# Patient Record
Sex: FEMALE
[De-identification: names, ages, dates, MRNs, and addresses within clinical notes are randomized per-mention and may not be internally consistent; named-entity substitution may affect disease eponyms.]

---

## 2017-10-23 PROBLEM — Z00.00 ENCOUNTER FOR PREVENTIVE HEALTH EXAMINATION: Status: ACTIVE | Noted: 2017-10-23

## 2018-03-01 ENCOUNTER — APPOINTMENT (OUTPATIENT)
Dept: ENDOCRINOLOGY | Facility: CLINIC | Age: 53
End: 2018-03-01

## 2025-06-09 ENCOUNTER — EMERGENCY (EMERGENCY)
Facility: HOSPITAL | Age: 60
LOS: 1 days | End: 2025-06-09
Attending: STUDENT IN AN ORGANIZED HEALTH CARE EDUCATION/TRAINING PROGRAM
Payer: COMMERCIAL

## 2025-06-09 ENCOUNTER — NON-APPOINTMENT (OUTPATIENT)
Age: 60
End: 2025-06-09

## 2025-06-09 VITALS
OXYGEN SATURATION: 99 % | WEIGHT: 154.98 LBS | HEART RATE: 71 BPM | RESPIRATION RATE: 18 BRPM | TEMPERATURE: 98 F | DIASTOLIC BLOOD PRESSURE: 86 MMHG | HEIGHT: 64 IN | SYSTOLIC BLOOD PRESSURE: 161 MMHG

## 2025-06-09 NOTE — ED ADULT TRIAGE NOTE - MEANS OF ARRIVAL
[FreeTextEntry1] : Spirometry was performed.\par FVC 2.94 L which is 67% predicted.\par FEV1 1.13 L which is 35% predicted.\par FEV1/FVC ratio 39%.\par FEF 25/75% 0.40 L/s which is 17% predicted.\par PEF is 2.73 L/s which is 33% predicted.\par There is significant bronchodilator response. ambulatory

## 2025-06-09 NOTE — ED ADULT TRIAGE NOTE - CHIEF COMPLAINT QUOTE
woke up at 0400 with HA and Dizziness. states she went to bed at 1900 yesterday and felt normal. states her legs feel "wobblie". states she went to urgent care and had an EKG that was abnormal. pt states there were abnormalities with her T waves and her HR was slow.

## 2025-06-10 VITALS
RESPIRATION RATE: 18 BRPM | TEMPERATURE: 98 F | SYSTOLIC BLOOD PRESSURE: 137 MMHG | DIASTOLIC BLOOD PRESSURE: 75 MMHG | HEART RATE: 61 BPM | OXYGEN SATURATION: 98 %

## 2025-06-10 LAB
ALBUMIN SERPL ELPH-MCNC: 4 G/DL — SIGNIFICANT CHANGE UP (ref 3.3–5)
ALP SERPL-CCNC: 74 U/L — SIGNIFICANT CHANGE UP (ref 40–120)
ALT FLD-CCNC: 14 U/L — SIGNIFICANT CHANGE UP (ref 10–45)
ANION GAP SERPL CALC-SCNC: 13 MMOL/L — SIGNIFICANT CHANGE UP (ref 5–17)
APPEARANCE UR: CLEAR — SIGNIFICANT CHANGE UP
AST SERPL-CCNC: 15 U/L — SIGNIFICANT CHANGE UP (ref 10–40)
BASOPHILS # BLD AUTO: 0.03 K/UL — SIGNIFICANT CHANGE UP (ref 0–0.2)
BASOPHILS NFR BLD AUTO: 0.6 % — SIGNIFICANT CHANGE UP (ref 0–2)
BILIRUB SERPL-MCNC: 0.4 MG/DL — SIGNIFICANT CHANGE UP (ref 0.2–1.2)
BILIRUB UR-MCNC: NEGATIVE — SIGNIFICANT CHANGE UP
BUN SERPL-MCNC: 13 MG/DL — SIGNIFICANT CHANGE UP (ref 7–23)
CALCIUM SERPL-MCNC: 9.8 MG/DL — SIGNIFICANT CHANGE UP (ref 8.4–10.5)
CHLORIDE SERPL-SCNC: 100 MMOL/L — SIGNIFICANT CHANGE UP (ref 96–108)
CO2 SERPL-SCNC: 22 MMOL/L — SIGNIFICANT CHANGE UP (ref 22–31)
COLOR SPEC: YELLOW — SIGNIFICANT CHANGE UP
CREAT SERPL-MCNC: 0.7 MG/DL — SIGNIFICANT CHANGE UP (ref 0.5–1.3)
DIFF PNL FLD: NEGATIVE — SIGNIFICANT CHANGE UP
EGFR: 101 ML/MIN/1.73M2 — SIGNIFICANT CHANGE UP
EGFR: 101 ML/MIN/1.73M2 — SIGNIFICANT CHANGE UP
EOSINOPHIL # BLD AUTO: 0.05 K/UL — SIGNIFICANT CHANGE UP (ref 0–0.5)
EOSINOPHIL NFR BLD AUTO: 1 % — SIGNIFICANT CHANGE UP (ref 0–6)
GLUCOSE SERPL-MCNC: 244 MG/DL — HIGH (ref 70–99)
GLUCOSE UR QL: >=1000 MG/DL
HCT VFR BLD CALC: 42.6 % — SIGNIFICANT CHANGE UP (ref 34.5–45)
HGB BLD-MCNC: 13.8 G/DL — SIGNIFICANT CHANGE UP (ref 11.5–15.5)
IMM GRANULOCYTES NFR BLD AUTO: 0.2 % — SIGNIFICANT CHANGE UP (ref 0–0.9)
KETONES UR QL: NEGATIVE MG/DL — SIGNIFICANT CHANGE UP
LEUKOCYTE ESTERASE UR-ACNC: NEGATIVE — SIGNIFICANT CHANGE UP
LYMPHOCYTES # BLD AUTO: 2.31 K/UL — SIGNIFICANT CHANGE UP (ref 1–3.3)
LYMPHOCYTES # BLD AUTO: 44.6 % — HIGH (ref 13–44)
MCHC RBC-ENTMCNC: 30.4 PG — SIGNIFICANT CHANGE UP (ref 27–34)
MCHC RBC-ENTMCNC: 32.4 G/DL — SIGNIFICANT CHANGE UP (ref 32–36)
MCV RBC AUTO: 93.8 FL — SIGNIFICANT CHANGE UP (ref 80–100)
MONOCYTES # BLD AUTO: 0.49 K/UL — SIGNIFICANT CHANGE UP (ref 0–0.9)
MONOCYTES NFR BLD AUTO: 9.5 % — SIGNIFICANT CHANGE UP (ref 2–14)
NEUTROPHILS # BLD AUTO: 2.29 K/UL — SIGNIFICANT CHANGE UP (ref 1.8–7.4)
NEUTROPHILS NFR BLD AUTO: 44.1 % — SIGNIFICANT CHANGE UP (ref 43–77)
NITRITE UR-MCNC: NEGATIVE — SIGNIFICANT CHANGE UP
NRBC BLD AUTO-RTO: 0 /100 WBCS — SIGNIFICANT CHANGE UP (ref 0–0)
PH UR: 8 — SIGNIFICANT CHANGE UP (ref 5–8)
PLATELET # BLD AUTO: 258 K/UL — SIGNIFICANT CHANGE UP (ref 150–400)
POTASSIUM SERPL-MCNC: 4.2 MMOL/L — SIGNIFICANT CHANGE UP (ref 3.5–5.3)
POTASSIUM SERPL-SCNC: 4.2 MMOL/L — SIGNIFICANT CHANGE UP (ref 3.5–5.3)
PROT SERPL-MCNC: 6.9 G/DL — SIGNIFICANT CHANGE UP (ref 6–8.3)
PROT UR-MCNC: NEGATIVE MG/DL — SIGNIFICANT CHANGE UP
RBC # BLD: 4.54 M/UL — SIGNIFICANT CHANGE UP (ref 3.8–5.2)
RBC # FLD: 12.4 % — SIGNIFICANT CHANGE UP (ref 10.3–14.5)
SODIUM SERPL-SCNC: 135 MMOL/L — SIGNIFICANT CHANGE UP (ref 135–145)
SP GR SPEC: >1.03 — HIGH (ref 1–1.03)
TROPONIN T, HIGH SENSITIVITY RESULT: <6 NG/L — SIGNIFICANT CHANGE UP (ref 0–51)
UROBILINOGEN FLD QL: 0.2 MG/DL — SIGNIFICANT CHANGE UP (ref 0.2–1)
WBC # BLD: 5.18 K/UL — SIGNIFICANT CHANGE UP (ref 3.8–10.5)
WBC # FLD AUTO: 5.18 K/UL — SIGNIFICANT CHANGE UP (ref 3.8–10.5)

## 2025-06-10 RX ORDER — METOCLOPRAMIDE HCL 10 MG
10 TABLET ORAL ONCE
Refills: 0 | Status: COMPLETED | OUTPATIENT
Start: 2025-06-10 | End: 2025-06-10

## 2025-06-10 RX ORDER — MECLIZINE HCL 12.5 MG
25 TABLET ORAL ONCE
Refills: 0 | Status: COMPLETED | OUTPATIENT
Start: 2025-06-10 | End: 2025-06-10

## 2025-06-10 RX ORDER — ACETAMINOPHEN 500 MG/5ML
975 LIQUID (ML) ORAL ONCE
Refills: 0 | Status: COMPLETED | OUTPATIENT
Start: 2025-06-10 | End: 2025-06-10

## 2025-06-10 RX ORDER — MECLIZINE HCL 12.5 MG
1 TABLET ORAL
Qty: 15 | Refills: 0
Start: 2025-06-10 | End: 2025-06-14

## 2025-06-10 RX ADMIN — Medication 10 MILLIGRAM(S): at 03:10

## 2025-06-10 RX ADMIN — Medication 25 MILLIGRAM(S): at 10:08

## 2025-06-10 RX ADMIN — Medication 975 MILLIGRAM(S): at 04:00

## 2025-06-10 RX ADMIN — Medication 975 MILLIGRAM(S): at 03:10

## 2025-06-10 NOTE — ED PROVIDER NOTE - DATE/TIME 1
10-Lanre-2025 09:56

## 2025-06-10 NOTE — ED PROVIDER NOTE - ATTENDING CONTRIBUTION TO CARE
Chris Guadalupe DO: I have personally performed a face to face medical and diagnostic evaluation of the patient. I have discussed with and reviewed the Resident's and/or ACP's and/or Medical/PA/NP student's note and agree with the History, ROS, Physical Exam and MDM unless otherwise indicated. A brief summary of my personal evaluation and impression can be found below.     59-year-old female with a history of diabetes, hyperlipidemia presents the ED for dizziness which started upon waking at 4 AM on morning of 6/9.  Went to sleep 6/8 at 7 PM and was feeling well at that time.  Patient denies any falls, states that when she stands up she feels like her symptoms are worse.  Improving symptoms still.  Feels more lightheaded but has had.  Went to urgent care today was told she has EKG and was sent to the ED for evaluation.  Currently patient has sinus bradycardia but otherwise grossly unremarkable EKG.  She is also told her heart rate was slow in the 40s at urgent care.  Denies any fevers any chills denies abdominal pain, no urinary complaints.    CONSTITUTIONAL: NAD  SKIN: Warm dry, normal skin turgor  HEAD: NCAT  EYES: EOMI, PERRLA, no scleral icterus, conjunctiva pink  NECK: Supple; non tender. Full ROM.  CARD: RRR  RESP: No respiratory distress  ABD: non-distended, no abdominal tenderness  MSK: Full ROM, no leg swelling  neuro: Cranial nerves II through XII intact, left upper extremity finger-nose normal, patient had slight difficulty with fine coordination of right upper extremity finger-nose.  Lower extremity heel-to-shin normal, patient was able to stand without difficulty, negative Romberg, gait normal but patient states she is feeling slightly unsteady.  PSYCH: Cooperative, appropriate.     Patient having dizziness which has been constant throughout the past day, not entirely positional like BPPV some concern for central cause.  With bradycardia earlier in the day, reported earlier abnormal EKG considering cardiac cause of symptoms especially as this seems to be orthostatic in nature.  Will check labs including troponin, EKG close symptoms been going on for approximately 24 hours from waking, unclear onset of symptoms but last known well greater than 24 hours ago.  Will get CT head with angio head and neck, check for electrolyte abnormalities, check for infectious etiology.  Patient has normal vital signs does not meet SIRS criteria at this time.  Dispo pending imaging, labs, consider further workup for cardiac/neurologic symptoms if symptoms persist despite Treatment.

## 2025-06-10 NOTE — ED ADULT NURSE NOTE - NSFALLUNIVINTERV_ED_ALL_ED
Bed/Stretcher in lowest position, wheels locked, appropriate side rails in place/Call bell, personal items and telephone in reach/Instruct patient to call for assistance before getting out of bed/chair/stretcher/Non-slip footwear applied when patient is off stretcher/[place] to call system/Physically safe environment - no spills, clutter or unnecessary equipment/Purposeful proactive rounding/Room/bathroom lighting operational, light cord in reach

## 2025-06-10 NOTE — ED PROVIDER NOTE - NSFOLLOWUPINSTRUCTIONS_ED_ALL_ED_FT
1. Follow up with your PCP within 2-3 days.   2. Take Ibuprofen (i.e. Motrin, Advil) 600mg every 8 hrs for pain as needed. Take with food.   May alternate with Acetaminophen (Tylenol) 650mg every 6 hours for pain as needed.  3. Take Meclizine 1 tablet every 8 hours as needed for dizziness.   4. Follow up with neurology within 1 week.   5. Return to the emergency department if you have worsening pain, dizziness, fainting, weakness, numbness or all other concerns.        Neurology  [address] [facility], [address]  [place] [address]  Phone: [phone]

## 2025-06-10 NOTE — CONSULT NOTE ADULT - ASSESSMENT
ED vitals notable for: afebrile, HR 71, /86, RR 17-18, SpO2 % on RA. Labs notable for: glucose 244. CT imaging disclosed: grossly unremarkable. Exam notable for ***.     NIHSS on admission: ***  LKN: ***  Pre-mRS: ***  NO tenecteplase d/t outside therapeutic window.  NO thrombectomy d/t no ELVO.    Impression:    Recommendations:    Formalized plan per attending attestation. SHENA HERNANDEZ is a 59y RIGHT-handed woman, with PMH significant for HTN, HLD, T2DM, who presents to [facility] ED on 6/9/2025, with c/o dizziness.    ED vitals notable for: afebrile, HR 71, /86, RR 17-18, SpO2 % on RA. Labs notable for: glucose 244. CT imaging disclosed: grossly unremarkable. Exam notable for +HIT.    NIHSS on admission: 0  LKN: 19:00, 6/8/2025  Pre-mRS: 0  NO tenecteplase d/t outside therapeutic window.  NO thrombectomy d/t no ELVO.    Impression: Acute vestibular syndrome. Positional symptoms with exam suggestive of peripheral etiology.    Recommendations:  [] Symptomatic management per ED  [] Upon discharge, patient should follow up with Neurology at [address] [place] [address], [address], [place] [address], [phone]. Outpatient MRI brain if appropriate.    Seen and d/w Dr. Pulido. Formalized plan per attending attestation.

## 2025-06-10 NOTE — ED ADULT NURSE NOTE - OBJECTIVE STATEMENT
Pt presents to ED endorsing "head heaviness, inability to get up" and dizziness w/ gait instability since 0400 yesterday, Pt endorses to have gone to , in which her EKG showed T wave abnormality and bradycardia. PMH of HTN, HLD, DM2- on Metformin. Pt denies current chest pain, chest palpitations, SOB, n/v/d, fever/chills, lightheadedness/dizziness, vision changes, jaw pain, back pain, episodes of urinary/fecal incontinence. Pt placed on CM, NSR. Pt presents AAOx4, moving all limbs spontaneously. Plan of care and monitoring discussed with pt. Bed locked and lowered for safety. Safety and comfort maintained.

## 2025-06-10 NOTE — ED PROVIDER NOTE - PATIENT PORTAL LINK FT
You can access the FollowMyHealth Patient Portal offered by [facility] by registering at the following website: [email or web address]. By joining [employer]’s FollowMyHealth portal, you will also be able to view your health information using other applications (apps) compatible with our system.

## 2025-06-10 NOTE — CONSULT NOTE ADULT - ATTENDING COMMENTS
HPI as per resident note, personally verified by me. Patient woke up on 6/9 at ~04:00 with head spinning dizziness as well as "wobbly" feeling in her BLE's and difficulty with gait. She reported symptoms were worse with head movement and limited her ambulation. She denied any associated focal neurologic deficits, abnormal movements, speech changes, hearing changes, vision changes, or incoordination. She briefly had a headache yesterday but it has since resolved. She feels symptoms are improved but not yet resolved. She has never had this before. She has severe claustrophobia with MRI and requires for it to be open.    Neurologic exam as per resident note with additions as below:  AAO x3, speech fluent  CN's II-XII intact. HIT (+) with RIGHT head turn  Strength 5/5 all  Sens intact all  FtN and HtS intact b/l  Downgoing b/l plantar response    < from: CT Angio Neck w/ IV Cont (06.10.25 @ 06:42) >    The principal dural venous sinuses are patent.  This includes the   superior sagittal sinus anterior and posterior limbs.  Each transverse   sinus is patent to sigmoid sinuses and patent jugular veins.    IMPRESSION:    1.  BRAIN:   No evidence of infarction. Convex contour the pituitary at   the suprasellar cistern is atypical in this age group ; possible   pituitary microadenoma    2.  RIGHT CAROTID SYSTEM:    No hemodynamically significant stenosis.    3LEFT CAROTID SYSTEM:     No hemodynamically significant stenosis.    4.   VERTEBRAL CIRCULATION:    Patent.    5.  ANTERIOR INTRACRANIAL CIRCULATION:     Unremarkable.    6.  POSTERIOR INTRACRANIAL CIRCULATION:    Unremarkable.    7.  No large vessel occlusion.    8. Right lamina papyracea fracture with nasal distraction and deformity   of the right medial rectus muscle    < end of copied text >      A/P:  Dizziness  DM type 2  HTN  Headache    - Patient with dizziness related to peripheral or cardiac process based on history, exam, and imaging results. May also be vestibular migraine although headache resolved and was rather mild. She is feeling better but not yet back to baseline. No other associated focal neurologic deficits or abnormal movements. CT head and CTA head/neck, personally reviewed by me, with possible pituitary microadenoma (non-contributory to current symptoms) but no other acute intracranial findings, focal stenosis, occlusion, aneurysm, dissection, or venous sinus thrombosis  - Orthostatic vital signs  - Symptomatic control  - Vestibular rehab  - No neurologic contraindications to discharge if patient is otherwise medically stable. Patient can follow up with Dr. Singh Friedman at [address] [facility] ([phone]) OR general neurology at [address] [place] [address] ([phone]) 1-2 weeks after discharge. Please instruct the patient to call the respective numbers to schedule this appointment.  - Above recommendations discussed with ED team, who verbalized agreement and understanding  - Continue to address above medical problems, as you are doing  - Will sign off

## 2025-06-10 NOTE — ED PROVIDER NOTE - PROGRESS NOTE DETAILS
ED sign out, eval for vertigo, CT/CTA w/ incidental findings, no recent trauma / diplopia, awaiting Neuro consult   - Pablo Beyer MD Pt received on sign out from night team. CT/CTA results reviewed. Pt with persistent dizziness. Neuro consulted will come see pt in ED. Will give meclizine. Dionne Castro PA-C Neurology evaluated at bedside. Stated likely peripheral vertigo. Ok for dc home with PO meclizine and outpatient f/u. I discussed results/recommendations with pt. Answered all questions/concerns. Stable for dc home. Dionne Castro PA-C

## 2025-06-10 NOTE — CONSULT NOTE ADULT - SUBJECTIVE AND OBJECTIVE BOX
Neurology - Consult Note    -  Spectra: [phone] ([facility]), [phone] ([facility]). For new consults, please page: [phone] ([facility]), [phone] ([facility]).  -    HPI: Patient SHENA HERNANDEZ is a 59y (1965) ***-handed woman who presents to [facility] ED on 6/9/2025, with c/o headache, dizziness, and unsteadyness.    ***    PMH significant for: HTN, HLD, T2DM    Review of Systems:  INCOMPLETE   All other review of systems is negative unless indicated above.    Allergies:  No Known Allergies      PMHx/PSHx/Family Hx: As above, otherwise see below       Social Hx:  Per HPI    Medications:  MEDICATIONS  (STANDING):  meclizine 25 milliGRAM(s) Oral Once    MEDICATIONS  (PRN):      Vitals:  T(C): 36.7 (06-10-25 @ 07:46), Max: 36.7 (06-09-25 @ 21:44)  HR: 60 (06-10-25 @ 07:46) (59 - 71)  BP: 131/68 (06-10-25 @ 07:46) (131/68 - 161/86)  RR: 17 (06-10-25 @ 07:46) (17 - 18)  SpO2: 97% (06-10-25 @ 07:46) (97% - 100%)    Physical Examination: INCOMPLETE  General - Sitting up on ED cart  Cardiovascular - No LE edema  Eyes - Non-injected conjunctivae, anicteric sclerae    Neurologic Exam:  Mental status:  - Eyes open spontaneously, attends to examiner  - Oriented to: person, place, and time  - Speech: fluent  - Repetition and naming: intact   - Follows simple and cross commands   - Attention/concentration: intact  - Recent and remote memory (including registration and recall): registration intact, 3/3 on 3-word recall  - Fund of knowledge: intact    Cranial nerves - PERRL - no rAPD, VFF with finger counting, EOMI - no nystagmus, face sensation (V1-V3) intact b/l, facial strength intact without asymmetry b/l, hearing grossly intact, palate with symmetric elevation, shoulder shrug intact b/l, tongue midline on protrusion with full lateral movement  Dysarthria: ***    Motor - Normal bulk for age throughout. No pronator drift.  Strength testing (R/L)  Deltoid:  5/5  Biceps:  5/5        Triceps:  5/5       Wrist Extension:  5/5      Wrist Flexion:  5/5       Interossei:  5/5        :  5/5    ***FFM intact   ***No orbiting with forearm rolling    Hip Flexion:  5/5  Hip Extension:  5/5      Knee Flexion:  5/5      Knee Extension:  5/5      Dorsiflexion:  5/5      Plantar Flexion:  5/5    Sensation - Light touch intact throughout    DTRs (R/L)  Biceps:  2+/2+        Triceps:  2+/2+       Brachioradialis:  2+/2+        Patellar:  2+/2+      Ankle:  2+/2+    no ankle clonus  Plantar response:  Down/Down  **Deferred d/t focused neurologic exam    Coordination - FNF, HTS intact b/l. No tremors appreciated.    Gait and station - Unable to assess d/t fall risk/safety concerns.    Labs:                        13.8   5.18  )-----------( 258      ( 10 Lanre 2025 03:08 )             42.6     06-10    135  |  100  |  13  ----------------------------<  244[H]  4.2   |  22  |  0.70    Ca    9.8      10 Lanre 2025 03:08    TPro  6.9  /  Alb  4.0  /  TBili  0.4  /  DBili  x   /  AST  15  /  ALT  14  /  AlkPhos  74  06-10    CAPILLARY BLOOD GLUCOSE        LIVER FUNCTIONS - ( 10 Lanre 2025 03:08 )  Alb: 4.0 g/dL / Pro: 6.9 g/dL / ALK PHOS: 74 U/L / ALT: 14 U/L / AST: 15 U/L / GGT: x               CSF:                  Radiology:  CT ANGIO BRAIN (W)AW IC  CT ANGIO NECK (W)AW IC  CT BRAIN    PROCEDURE DATE:  06/10/2025      INTERPRETATION:  Three examinations were performed on this patient:  1.  CT head without intravenous contrast  2.  CT Angiography of the carotid arteries with IV contrast (and without,   if performed)  3.  CT Angiography of the intracranial circulation with IV contrast (and   without, if performed)    CLINICAL INFORMATION:    CVA/STROKE   dizziness   headache   MCT    COMPARISON:    None    FINDINGS:    BRAIN    BRAIN PARENCHYMA:    The brain  demonstrates no abnormal attenuation.     Cerebral cortical gray-white matter differentiation is maintained.  No   acute cerebral cortical infarct is seen.  No intracranial hemorrhage is   found.  No mass effect is found in the brain.    CSF SPACES:  The ventricles, sulci and basal cisterns appear mildly   dilated reflecting diffuse brain volume loss.    Convex superior contour   of the pituitary does not directly compromise the optic chiasm.    HEAD AND NECK STRUCTURES:  The right lamina papyracea demonstrates   fracture defect. There is outward herniation of extraconal fat. There is   also distraction and deformity of the nasal surface of the right medial   rectus muscle. The left orbit appears intact.  The included paranasal   sinuses  are clear.  The nasal cavity appears intact.  The nasopharynx is   symmetric.  The central skull base is intact.  The temporal bones   demonstrate patent petrous air cells.  The calvarium appears unremarkable.    CAROTID CIRCULATION    The thoracic aortic arch appears intact.  The great vessel origins remain   patent.    The right carotid circulation demonstrates an intact common carotid   artery.  The carotid bulb is intact  without hemodynamically significant   stenosis.  The internal carotid is patent to the skull base.    The left carotid circulation demonstrates an intact common carotid   artery.  The carotid bulb is intact  without hemodynamically significant   stenosis.  The internal carotid is patent to the skull base.    The vertebral arteries are patent. The right vertebral arterial ostium is   associated with calcified atherosclerotic plaque although no significant   stenosis results. The left vertebral artery has augmented spinal   tortuosity in its foraminal V2 segment. The degree of vertebral asymmetry   is within physiologic limits. Each vertebral artery ascends in the neck   with near constant caliber.    INTRACRANIAL CIRCULATION    The ANTERIOR circulation demonstrates intact inflow from the ascending   cervical segment to the petrous segment of each internal carotid artery.   The cavernous and clinoid segments appear intact.   The ophthalmic   arteries are demonstrated as patent vessels on each side.    The anterior   cerebral arterial A1 segments are patent and near symmetric in caliber.    An anterior communicating artery is present. The anterior cerebral   arterial A2 segments are patent to peripheral branching. The right middle   cerebral artery demonstrates an intact initial M1 segment and patent   peripheral anterior and posterior division sylvian branches.  The left   middle cerebral artery demonstrates an intact initial M1 segment and   patent peripheral anterior and posterior division sylvian branches.    The POSTERIOR circulation demonstrates intact inflow from each vertebral   artery.   The vertebral arteries are near symmetric in caliber.    PICA   artery is demonstrated on the left, not definitely identified on the   right. Asymmetric right anterior inferior cerebellar artery is present.   The basilar artery appears intact.  Superior cerebellar arteries are   demonstrated.  There is fetal origin of left posterior cerebral artery   from the ipsilateral internal carotid artery (typically incidental   developmental variant). Small caliber left P1 segment is present.   Each   posterior cerebral artery is patent to peripheral branching.    The principal dural venous sinuses are patent.  This includes the   superior sagittal sinus anterior and posterior limbs.  Each transverse   sinus is patent to sigmoid sinuses and patent jugular veins.    No intracranial aneurysm or vascular malformation is identified.    ADDITIONAL FINDINGS:  None.      IMPRESSION:    1.  BRAIN:   No evidence of infarction. Convex contour the pituitary at   the suprasellar cistern is atypical in this age group ; possible   pituitary microadenoma    2.  RIGHT CAROTID SYSTEM:    No hemodynamically significant stenosis.    3   LEFT CAROTID SYSTEM:     No hemodynamically significant stenosis.    4.   VERTEBRAL CIRCULATION:    Patent.    5.  ANTERIOR INTRACRANIAL CIRCULATION:     Unremarkable.    6.  POSTERIOR INTRACRANIAL CIRCULATION:    Unremarkable.    7.  No large vessel occlusion.    8. Right lamina papyracea fracture with nasal distraction and deformity   of the right medial rectus muscle     Neurology - Consult Note    -  Spectra: [phone] ([facility]), [phone] ([facility]). For new consults, please page: [phone] ([facility]), [phone] ([facility]).  -    HPI: Patient SHENA HERNANDEZ is a 59y (1965) RIGHT-handed woman who presents to [facility] ED on 6/9/2025, with c/o dizziness.    PMH significant for: HTN, HLD, T2DM    Patient seen unaccompanied in the ED. Says she went to bed before 7 PM on Sunday, 6/8/2025. Had been at the [facility] all day. Awoke at 4 AM on Monday, 6/9/2025. Said her "head was spinning," couldn't stand up, felt "wobbly," and the floor felt like it was uneven. Never felt this before. +Nausea. Single episode of emesis. Worse with head movement. Improved since coming into the ED - rec'd acetaminophen, meclizine, and metoclopramide.     Denies history of stroke/MI. No AC/AP. Has poorly-controlled T2DM (no insulin - takes PO medications, tried semaglutide in the past but made her nauseated). History of HTN. On statin for HLD. No assistive device at baseline. Climbs stairs. Does not drive. Never smoker/alcohol/recreational drug use. Lives with her  and children. 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    No weakness, sensory changes, vision changes, speech changes. Denies recent illness, hearing changes, pain in her ears. Denies headache, neck pain, diarrhea, dysuria, rash.    Feeling better. Would like to go home. Says she is very claustrophobic. Does not want to stay for an inpatient MRI if recommended. Only can tolerate "open MRI." Has had in past for lumbar spine DDD.    Review of Systems:  All other review of systems is negative unless indicated above.    Allergies:  No Known Allergies      PMHx/PSHx/Family Hx: As above, otherwise see below       Social Hx:  Per HPI    Medications:  MEDICATIONS  (STANDING):  meclizine 25 milliGRAM(s) Oral Once    MEDICATIONS  (PRN):      Vitals:  T(C): 36.7 (06-10-25 @ 07:46), Max: 36.7 (06-09-25 @ 21:44)  HR: 60 (06-10-25 @ 07:46) (59 - 71)  BP: 131/68 (06-10-25 @ 07:46) (131/68 - 161/86)  RR: 17 (06-10-25 @ 07:46) (17 - 18)  SpO2: 97% (06-10-25 @ 07:46) (97% - 100%)    Physical Examination:  General - Sitting up on ED cart, appears stated age, in NAD, pleasant/conversational  Cardiovascular - No LE edema  Eyes - Non-injected conjunctivae, anicteric sclerae    Neurologic Exam:  Mental status:  - Eyes open spontaneously, attends to examiner  - Oriented to: person and time. Says she is at "[facility]" (she is at [facility]).  - Speech: fluent  - Repetition and naming: intact   - Follows simple and cross commands   - Attention/concentration: spells WORLD forward and backward  - Recent and remote memory (including registration and recall): registration intact  - Fund of knowledge: intact    Cranial nerves - PERRL - no rAPD, VFF with finger counting, EOMI - no nystagmus, face sensation (V1-V3) intact b/l, facial strength intact without asymmetry b/l, hearing grossly intact, palate with symmetric elevation, shoulder shrug intact b/l, tongue midline on protrusion with full lateral movement  Dysarthria: not present    HINTS: no nystagmus, test of skew negative, HIT with corrective saccade with R head turn (lesion in R ear)    Motor - Normal bulk for age throughout. No pronator drift.  Strength testing (R/L)  Deltoid:  5/5  Biceps:  5/5        Triceps:  5/5           :  5/5    Hip Flexion:  5/5  Hip Extension:  5/5      Knee Flexion:  5/5      Knee Extension:  5/5      Dorsiflexion:  5/5      Plantar Flexion:  5/5    Sensation - Light touch intact throughout    DTRs  Grossly symmetrical throughout  Plantar response:  Down/Down    Coordination - FNF, HTS intact b/l    Gait and station - Unable to assess d/t fall risk/safety concerns.    Labs:                        13.8   5.18  )-----------( 258      ( 10 Lanre 2025 03:08 )             42.6     06-10    135  |  100  |  13  ----------------------------<  244[H]  4.2   |  22  |  0.70    Ca    9.8      10 Lanre 2025 03:08    TPro  6.9  /  Alb  4.0  /  TBili  0.4  /  DBili  x   /  AST  15  /  ALT  14  /  AlkPhos  74  06-10    CAPILLARY BLOOD GLUCOSE        LIVER FUNCTIONS - ( 10 Lanre 2025 03:08 )  Alb: 4.0 g/dL / Pro: 6.9 g/dL / ALK PHOS: 74 U/L / ALT: 14 U/L / AST: 15 U/L / GGT: x               CSF:    Radiology:  CT ANGIO BRAIN (W)AW IC  CT ANGIO NECK (W)AW IC  CT BRAIN    PROCEDURE DATE:  06/10/2025      INTERPRETATION:  Three examinations were performed on this patient:  1.  CT head without intravenous contrast  2.  CT Angiography of the carotid arteries with IV contrast (and without,   if performed)  3.  CT Angiography of the intracranial circulation with IV contrast (and   without, if performed)    CLINICAL INFORMATION:    CVA/STROKE   dizziness   headache   MCT    COMPARISON:    None    FINDINGS:    BRAIN    BRAIN PARENCHYMA:    The brain  demonstrates no abnormal attenuation.     Cerebral cortical gray-white matter differentiation is maintained.  No   acute cerebral cortical infarct is seen.  No intracranial hemorrhage is   found.  No mass effect is found in the brain.    CSF SPACES:  The ventricles, sulci and basal cisterns appear mildly   dilated reflecting diffuse brain volume loss.    Convex superior contour   of the pituitary does not directly compromise the optic chiasm.    HEAD AND NECK STRUCTURES:  The right lamina papyracea demonstrates   fracture defect. There is outward herniation of extraconal fat. There is   also distraction and deformity of the nasal surface of the right medial   rectus muscle. The left orbit appears intact.  The included paranasal   sinuses  are clear.  The nasal cavity appears intact.  The nasopharynx is   symmetric.  The central skull base is intact.  The temporal bones   demonstrate patent petrous air cells.  The calvarium appears unremarkable.    CAROTID CIRCULATION    The thoracic aortic arch appears intact.  The great vessel origins remain   patent.    The right carotid circulation demonstrates an intact common carotid   artery.  The carotid bulb is intact  without hemodynamically significant   stenosis.  The internal carotid is patent to the skull base.    The left carotid circulation demonstrates an intact common carotid   artery.  The carotid bulb is intact  without hemodynamically significant   stenosis.  The internal carotid is patent to the skull base.    The vertebral arteries are patent. The right vertebral arterial ostium is   associated with calcified atherosclerotic plaque although no significant   stenosis results. The left vertebral artery has augmented spinal   tortuosity in its foraminal V2 segment. The degree of vertebral asymmetry   is within physiologic limits. Each vertebral artery ascends in the neck   with near constant caliber.    INTRACRANIAL CIRCULATION    The ANTERIOR circulation demonstrates intact inflow from the ascending   cervical segment to the petrous segment of each internal carotid artery.   The cavernous and clinoid segments appear intact.   The ophthalmic   arteries are demonstrated as patent vessels on each side.    The anterior   cerebral arterial A1 segments are patent and near symmetric in caliber.    An anterior communicating artery is present. The anterior cerebral   arterial A2 segments are patent to peripheral branching. The right middle   cerebral artery demonstrates an intact initial M1 segment and patent   peripheral anterior and posterior division sylvian branches.  The left   middle cerebral artery demonstrates an intact initial M1 segment and   patent peripheral anterior and posterior division sylvian branches.    The POSTERIOR circulation demonstrates intact inflow from each vertebral   artery.   The vertebral arteries are near symmetric in caliber.    PICA   artery is demonstrated on the left, not definitely identified on the   right. Asymmetric right anterior inferior cerebellar artery is present.   The basilar artery appears intact.  Superior cerebellar arteries are   demonstrated.  There is fetal origin of left posterior cerebral artery   from the ipsilateral internal carotid artery (typically incidental   developmental variant). Small caliber left P1 segment is present.   Each   posterior cerebral artery is patent to peripheral branching.    The principal dural venous sinuses are patent.  This includes the   superior sagittal sinus anterior and posterior limbs.  Each transverse   sinus is patent to sigmoid sinuses and patent jugular veins.    No intracranial aneurysm or vascular malformation is identified.    ADDITIONAL FINDINGS:  None.      IMPRESSION:    1.  BRAIN:   No evidence of infarction. Convex contour the pituitary at   the suprasellar cistern is atypical in this age group ; possible   pituitary microadenoma    2.  RIGHT CAROTID SYSTEM:    No hemodynamically significant stenosis.    3   LEFT CAROTID SYSTEM:     No hemodynamically significant stenosis.    4.   VERTEBRAL CIRCULATION:    Patent.    5.  ANTERIOR INTRACRANIAL CIRCULATION:     Unremarkable.    6.  POSTERIOR INTRACRANIAL CIRCULATION:    Unremarkable.    7.  No large vessel occlusion.    8. Right lamina papyracea fracture with nasal distraction and deformity   of the right medial rectus muscle

## 2025-08-04 ENCOUNTER — APPOINTMENT (OUTPATIENT)
Dept: NEUROLOGY | Facility: CLINIC | Age: 60
End: 2025-08-04